# Patient Record
Sex: FEMALE | Race: WHITE | NOT HISPANIC OR LATINO | URBAN - METROPOLITAN AREA
[De-identification: names, ages, dates, MRNs, and addresses within clinical notes are randomized per-mention and may not be internally consistent; named-entity substitution may affect disease eponyms.]

---

## 2021-01-19 ENCOUNTER — IMPORTED ENCOUNTER (OUTPATIENT)
Dept: URBAN - METROPOLITAN AREA CLINIC 50 | Facility: CLINIC | Age: 66
End: 2021-01-19

## 2021-01-19 NOTE — PATIENT DISCUSSION
"""Patient experiencing shaking of her eyes in the evening.  Patient says it will happen for stretch of ""

## 2021-01-19 NOTE — PATIENT DISCUSSION
"""Patient has dry eye. Will start patient on artificial tears.  Will continue to monitor. "" ""Start Artificial tears both eyes three times a day

## 2021-02-01 ENCOUNTER — IMPORTED ENCOUNTER (OUTPATIENT)
Dept: URBAN - METROPOLITAN AREA CLINIC 50 | Facility: CLINIC | Age: 66
End: 2021-02-01

## 2021-04-17 ASSESSMENT — TONOMETRY
OS_IOP_MMHG: 12
OD_IOP_MMHG: 12

## 2021-04-17 ASSESSMENT — VISUAL ACUITY
OD_OTHER: 20/30. 20/70.
OS_CC: 20/25-2
OD_BAT: 20/30
OS_BAT: 20/20
OD_CC: 20/25+1
OS_PH: @ 14 IN
OS_CC: J2@ 14 IN
OD_CC: J2@ 14 IN
OS_OTHER: 20/20. 20/50.
OD_PH: @ 14 IN

## 2021-07-21 ENCOUNTER — PREPPED CHART (OUTPATIENT)
Dept: URBAN - METROPOLITAN AREA CLINIC 53 | Facility: CLINIC | Age: 66
End: 2021-07-21

## 2021-07-23 ENCOUNTER — 6 MONTH COMPREHENSIVE EXAM (OUTPATIENT)
Dept: URBAN - METROPOLITAN AREA CLINIC 53 | Facility: CLINIC | Age: 66
End: 2021-07-23

## 2021-07-23 DIAGNOSIS — H35.363: ICD-10-CM

## 2021-07-23 DIAGNOSIS — H04.123: ICD-10-CM

## 2021-07-23 DIAGNOSIS — H43.813: ICD-10-CM

## 2021-07-23 DIAGNOSIS — H55.00: ICD-10-CM

## 2021-07-23 DIAGNOSIS — H31.021: ICD-10-CM

## 2021-07-23 DIAGNOSIS — H25.13: ICD-10-CM

## 2021-07-23 PROCEDURE — 92014 COMPRE OPH EXAM EST PT 1/>: CPT

## 2021-07-23 PROCEDURE — 92134 CPTRZ OPH DX IMG PST SGM RTA: CPT

## 2021-07-23 PROCEDURE — 92015 DETERMINE REFRACTIVE STATE: CPT

## 2021-07-23 ASSESSMENT — VISUAL ACUITY
OU_CC: J1+
OS_CC: 20/30
OD_CC: 20/30

## 2021-07-23 ASSESSMENT — TONOMETRY
OS_IOP_MMHG: 13
OD_IOP_MMHG: 14

## 2021-07-23 NOTE — PATIENT DISCUSSION
Patient was scheduled to see neurologist last month. Went to a rheumatologist and was told it was actually an ear problem. Will continue to monitor.

## 2021-07-23 NOTE — PATIENT DISCUSSION
Patient denies staring directly into the sunlight or looking into a laser/other bright concentrated sources fo light. 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 Best corrected vision is 20/20. Not concerned at this time. Patient wishes to be seen annually for examinations. Will continue to monitor.

## 2022-06-10 ENCOUNTER — ESTABLISHED PATIENT (OUTPATIENT)
Dept: URBAN - METROPOLITAN AREA CLINIC 53 | Facility: CLINIC | Age: 67
End: 2022-06-10

## 2022-06-10 DIAGNOSIS — H25.13: ICD-10-CM

## 2022-06-10 DIAGNOSIS — H04.123: ICD-10-CM

## 2022-06-10 DIAGNOSIS — H31.021: ICD-10-CM

## 2022-06-10 DIAGNOSIS — H35.363: ICD-10-CM

## 2022-06-10 DIAGNOSIS — H43.813: ICD-10-CM

## 2022-06-10 PROCEDURE — 92014 COMPRE OPH EXAM EST PT 1/>: CPT

## 2022-06-10 PROCEDURE — 92134 CPTRZ OPH DX IMG PST SGM RTA: CPT

## 2022-06-10 ASSESSMENT — VISUAL ACUITY
OU_CC: J1+ @ 14"
OD_GLARE: 20/30
OS_CC: 20/20
OS_GLARE: 20/40
OU_CC: 20/20-2
OS_GLARE: 20/30
OD_GLARE: 20/40
OD_CC: 20/25-2

## 2022-06-10 ASSESSMENT — TONOMETRY
OS_IOP_MMHG: 15
OD_IOP_MMHG: 15

## 2023-06-12 ENCOUNTER — COMPREHENSIVE EXAM (OUTPATIENT)
Dept: URBAN - METROPOLITAN AREA CLINIC 53 | Facility: CLINIC | Age: 68
End: 2023-06-12

## 2023-06-12 DIAGNOSIS — H25.13: ICD-10-CM

## 2023-06-12 DIAGNOSIS — H31.021: ICD-10-CM

## 2023-06-12 DIAGNOSIS — H43.813: ICD-10-CM

## 2023-06-12 DIAGNOSIS — H04.123: ICD-10-CM

## 2023-06-12 DIAGNOSIS — H35.363: ICD-10-CM

## 2023-06-12 DIAGNOSIS — H52.4: ICD-10-CM

## 2023-06-12 DIAGNOSIS — H35.3131: ICD-10-CM

## 2023-06-12 PROCEDURE — 92015 DETERMINE REFRACTIVE STATE: CPT

## 2023-06-12 PROCEDURE — 92134 CPTRZ OPH DX IMG PST SGM RTA: CPT

## 2023-06-12 PROCEDURE — 92014 COMPRE OPH EXAM EST PT 1/>: CPT

## 2023-06-12 ASSESSMENT — TONOMETRY
OD_IOP_MMHG: 14
OS_IOP_MMHG: 15

## 2023-06-12 ASSESSMENT — VISUAL ACUITY
OU_CC: J1+
OS_GLARE: 20/40
OD_GLARE: 20/40
OU_CC: 20/25
OS_CC: 20/25-2
OS_GLARE: 20/40
OD_CC: 20/50
OD_GLARE: 20/40

## 2023-09-08 ENCOUNTER — ESTABLISHED PATIENT (OUTPATIENT)
Dept: URBAN - METROPOLITAN AREA CLINIC 53 | Facility: CLINIC | Age: 68
End: 2023-09-08

## 2023-09-08 DIAGNOSIS — H04.123: ICD-10-CM

## 2023-09-08 PROCEDURE — 92012 INTRM OPH EXAM EST PATIENT: CPT

## 2023-09-08 ASSESSMENT — VISUAL ACUITY
OS_CC: 20/30
OD_CC: 20/30
OU_CC: 20/30

## 2023-09-08 ASSESSMENT — TONOMETRY
OS_IOP_MMHG: 13
OD_IOP_MMHG: 11

## 2024-03-01 ENCOUNTER — ESTABLISHED PATIENT (OUTPATIENT)
Dept: URBAN - METROPOLITAN AREA CLINIC 53 | Facility: CLINIC | Age: 69
End: 2024-03-01

## 2024-03-01 VITALS — HEART RATE: 76 BPM | HEIGHT: 55 IN | DIASTOLIC BLOOD PRESSURE: 74 MMHG | SYSTOLIC BLOOD PRESSURE: 120 MMHG

## 2024-03-01 DIAGNOSIS — H25.13: ICD-10-CM

## 2024-03-01 DIAGNOSIS — H53.2: ICD-10-CM

## 2024-03-01 DIAGNOSIS — H43.813: ICD-10-CM

## 2024-03-01 DIAGNOSIS — H35.363: ICD-10-CM

## 2024-03-01 DIAGNOSIS — H35.3131: ICD-10-CM

## 2024-03-01 PROCEDURE — 99214 OFFICE O/P EST MOD 30 MIN: CPT

## 2024-03-01 PROCEDURE — 92134 CPTRZ OPH DX IMG PST SGM RTA: CPT

## 2024-03-01 ASSESSMENT — VISUAL ACUITY
OS_CC: 20/30
OU_CC: 20/30
OD_CC: 20/25-2

## 2024-03-01 ASSESSMENT — TONOMETRY
OD_IOP_MMHG: 15
OS_IOP_MMHG: 14

## 2024-03-13 ENCOUNTER — FOLLOW UP (OUTPATIENT)
Dept: URBAN - METROPOLITAN AREA CLINIC 53 | Facility: CLINIC | Age: 69
End: 2024-03-13

## 2024-03-13 DIAGNOSIS — H04.123: ICD-10-CM

## 2024-03-13 DIAGNOSIS — H53.2: ICD-10-CM

## 2024-03-13 PROCEDURE — 99213 OFFICE O/P EST LOW 20 MIN: CPT

## 2024-03-13 ASSESSMENT — VISUAL ACUITY
OD_CC: 20/40
OD_PH: 20/25
OS_CC: 20/40-1

## 2024-03-13 ASSESSMENT — TONOMETRY
OS_IOP_MMHG: 16
OD_IOP_MMHG: 16

## 2024-05-20 ENCOUNTER — COMPREHENSIVE EXAM (OUTPATIENT)
Dept: URBAN - METROPOLITAN AREA CLINIC 53 | Facility: CLINIC | Age: 69
End: 2024-05-20

## 2024-05-20 DIAGNOSIS — H52.4: ICD-10-CM

## 2024-05-20 DIAGNOSIS — H43.813: ICD-10-CM

## 2024-05-20 DIAGNOSIS — H25.13: ICD-10-CM

## 2024-05-20 DIAGNOSIS — H31.021: ICD-10-CM

## 2024-05-20 DIAGNOSIS — H10.45: ICD-10-CM

## 2024-05-20 DIAGNOSIS — H55.00: ICD-10-CM

## 2024-05-20 DIAGNOSIS — H35.3131: ICD-10-CM

## 2024-05-20 DIAGNOSIS — H35.363: ICD-10-CM

## 2024-05-20 DIAGNOSIS — H04.123: ICD-10-CM

## 2024-05-20 PROCEDURE — 92134 CPTRZ OPH DX IMG PST SGM RTA: CPT

## 2024-05-20 PROCEDURE — 92015 DETERMINE REFRACTIVE STATE: CPT

## 2024-05-20 PROCEDURE — 99214 OFFICE O/P EST MOD 30 MIN: CPT

## 2024-05-20 ASSESSMENT — VISUAL ACUITY
OD_GLARE: 20/25
OD_GLARE: 20/25
OS_CC: 20/25-2 PUSH
OS_GLARE: 20/30
OS_GLARE: 20/30
OD_CC: 20/25-1

## 2024-05-20 ASSESSMENT — KERATOMETRY
OS_K2POWER_DIOPTERS: 49.25
OD_K1POWER_DIOPTERS: 48.25
OD_AXISANGLE_DEGREES: 084
OS_AXISANGLE2_DEGREES: 176
OS_K1POWER_DIOPTERS: 48.25
OD_K2POWER_DIOPTERS: 50.00
OS_AXISANGLE_DEGREES: 086
OD_AXISANGLE2_DEGREES: 174

## 2024-05-20 ASSESSMENT — TONOMETRY
OS_IOP_MMHG: 12
OD_IOP_MMHG: 12